# Patient Record
Sex: FEMALE | NOT HISPANIC OR LATINO | URBAN - METROPOLITAN AREA
[De-identification: names, ages, dates, MRNs, and addresses within clinical notes are randomized per-mention and may not be internally consistent; named-entity substitution may affect disease eponyms.]

---
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
## 2020-01-03 NOTE — ED PROVIDER NOTE - NS ED NOTE AC HIGH RISK COUNTRIES
Health Maintenance Due   Topic Date Due   â¢ Pneumococcal Vaccine 0-64 (1 of 1 - PPSV23) 08/23/1995   â¢ Varicella Vaccine (1 of 2 - 13+ 2-dose series) 08/23/2002   â¢ Cervical Cancer Screening HPV CO-Testing  08/23/2019   â¢ Influenza Vaccine (1) 09/01/2019       Patient is due for topics listed above, she wishes to proceed with Cervical cancer screening, but is not proceeding with Immunization(s) Influenza, Pneumococcal and Varicella at this time. No

## 2021-06-19 ENCOUNTER — EMERGENCY (EMERGENCY)
Facility: HOSPITAL | Age: 21
LOS: 1 days | Discharge: HOME | End: 2021-06-19
Attending: EMERGENCY MEDICINE | Admitting: EMERGENCY MEDICINE
Payer: COMMERCIAL

## 2021-06-19 VITALS
SYSTOLIC BLOOD PRESSURE: 126 MMHG | DIASTOLIC BLOOD PRESSURE: 84 MMHG | OXYGEN SATURATION: 100 % | RESPIRATION RATE: 16 BRPM | TEMPERATURE: 98 F | HEART RATE: 82 BPM

## 2021-06-19 PROCEDURE — 99283 EMERGENCY DEPT VISIT LOW MDM: CPT

## 2021-06-19 RX ORDER — DIPHENHYDRAMINE HCL 50 MG
1 CAPSULE ORAL
Qty: 30 | Refills: 0
Start: 2021-06-19

## 2021-06-19 NOTE — ED ADULT TRIAGE NOTE - CHIEF COMPLAINT QUOTE
rash to face ,legs shoulder with itching x 2 days. saw md today referred to see dermatologist ., denies diff breathing,fever

## 2021-06-19 NOTE — ED PROVIDER NOTE - PATIENT PORTAL LINK FT
You can access the FollowMyHealth Patient Portal offered by [facility] by registering at the following website: [email or web address]. By joining [employer]’s FollowMyHealth portal, you will also be able to view your health information using other applications (apps) compatible with our system.

## 2021-06-19 NOTE — ED PROVIDER NOTE - OBJECTIVE STATEMENT
21 yr old F here with complaint of rash. Pt states that for the past few years has developed pruritic rash over various parts of her body. Pt states has one on her shoulder and one on her thigh that have been there for approximately 1 year. Pt states she recently developed a different type of rash in the form of itchy papules over her neck. Pt reports her doctor has been prescribing her a combination antifungal steroid cream which has been increasing in potency over past year with little relief. Pt saw doctor today who recommended she see a dermatologist for a skin biopsy and evaluation. Denies any new diet, detergents, or skin products. No trouble breathing, no abdominal pain, no nausea, no vomiting.

## 2021-06-19 NOTE — ED PROVIDER NOTE - SKIN RASH DESCRIPTION
Shoulder and thigh with elevated plaques with dry scaling over. Neck with fine erythematous papules which are non-blanching. No discharge associated with rashes.

## 2021-06-19 NOTE — ED PROVIDER NOTE - CLINICAL SUMMARY MEDICAL DECISION MAKING FREE TEXT BOX
21 yr old F with likely psoriatic rash, possible heat rash, not consistent with anaphylaxis. Will prescribe antihistamine for pruritus and give dermatology f/u for continued outpatient care. 21 yr old F with likely psoriatic rash and new possible heat rash, not consistent with anaphylaxis or cellulitis. Will prescribe antihistamine for pruritus and give dermatology f/u for continued outpatient workup.

## 2021-06-21 ENCOUNTER — APPOINTMENT (OUTPATIENT)
Dept: DERMATOLOGY | Facility: CLINIC | Age: 21
End: 2021-06-21
Payer: COMMERCIAL

## 2021-06-21 VITALS — WEIGHT: 146 LBS | BODY MASS INDEX: 24.92 KG/M2 | HEIGHT: 64 IN

## 2021-06-21 DIAGNOSIS — L30.9 DERMATITIS, UNSPECIFIED: ICD-10-CM

## 2021-06-21 DIAGNOSIS — Z78.9 OTHER SPECIFIED HEALTH STATUS: ICD-10-CM

## 2021-06-21 DIAGNOSIS — Z91.89 OTHER SPECIFIED PERSONAL RISK FACTORS, NOT ELSEWHERE CLASSIFIED: ICD-10-CM

## 2021-06-21 PROBLEM — Z00.00 ENCOUNTER FOR PREVENTIVE HEALTH EXAMINATION: Status: ACTIVE | Noted: 2021-06-21

## 2021-06-21 PROCEDURE — 99204 OFFICE O/P NEW MOD 45 MIN: CPT

## 2021-06-21 RX ORDER — MUPIROCIN 20 MG/G
2 OINTMENT TOPICAL
Qty: 1 | Refills: 2 | Status: ACTIVE | COMMUNITY
Start: 2021-06-21 | End: 1900-01-01

## 2021-06-21 RX ORDER — METRONIDAZOLE 7.5 MG/G
0.75 CREAM TOPICAL
Qty: 1 | Refills: 2 | Status: ACTIVE | COMMUNITY
Start: 2021-06-21 | End: 1900-01-01

## 2021-06-21 RX ORDER — TRIAMCINOLONE ACETONIDE 1 MG/G
0.1 OINTMENT TOPICAL
Qty: 1 | Refills: 0 | Status: ACTIVE | COMMUNITY
Start: 2021-06-21 | End: 1900-01-01

## 2021-07-26 ENCOUNTER — APPOINTMENT (OUTPATIENT)
Dept: DERMATOLOGY | Facility: CLINIC | Age: 21
End: 2021-07-26
